# Patient Record
Sex: FEMALE | Race: WHITE | Employment: UNEMPLOYED | URBAN - METROPOLITAN AREA
[De-identification: names, ages, dates, MRNs, and addresses within clinical notes are randomized per-mention and may not be internally consistent; named-entity substitution may affect disease eponyms.]

---

## 2023-03-13 ENCOUNTER — TELEPHONE (OUTPATIENT)
Dept: PRIMARY CARE | Facility: CLINIC | Age: 63
End: 2023-03-13
Payer: COMMERCIAL

## 2023-03-13 NOTE — TELEPHONE ENCOUNTER
Pt. Calling- went [facility]  today and was diagnosed with covid. Should she start any medication. Did not receive any at [place]. Also having depression issues, especially with covid diagnosis. What can she take for that.   [phone]

## 2023-03-17 NOTE — TELEPHONE ENCOUNTER
Spoke with Danisha and she states that she is feeling much better now and she is out of the woods. She states that she is eating now also. Asked her about the depression and that you could see her on Monday and she states that the depression is better now too and does not need to be seen. She thinks it was just all from being sick and having covid. Let her know to call us if any concerns develop.

## 2023-03-17 NOTE — TELEPHONE ENCOUNTER
Please call her and tell her that I am very sorry that I am just getting to her message. She was told she had covid; ask her what symptoms of covid she is having now? Also tell her that I can see her on Monday at 9:30 and we can address her depression as well. She will be good to come to the office on Monday regarding her covid, as it was DX March 13. She just needs to wear a mask. Thank you.

## 2023-05-08 ENCOUNTER — OFFICE VISIT (OUTPATIENT)
Dept: PRIMARY CARE | Facility: CLINIC | Age: 63
End: 2023-05-08
Payer: COMMERCIAL

## 2023-05-08 VITALS
DIASTOLIC BLOOD PRESSURE: 76 MMHG | SYSTOLIC BLOOD PRESSURE: 145 MMHG | WEIGHT: 93 LBS | HEART RATE: 76 BPM | BODY MASS INDEX: 18.75 KG/M2 | HEIGHT: 59 IN

## 2023-05-08 DIAGNOSIS — I10 BENIGN HYPERTENSION: Primary | ICD-10-CM

## 2023-05-08 PROBLEM — M81.0 OSTEOPOROSIS: Status: ACTIVE | Noted: 2023-05-08

## 2023-05-08 PROCEDURE — 99214 OFFICE O/P EST MOD 30 MIN: CPT | Performed by: INTERNAL MEDICINE

## 2023-05-08 PROCEDURE — 3077F SYST BP >= 140 MM HG: CPT | Performed by: INTERNAL MEDICINE

## 2023-05-08 PROCEDURE — 3078F DIAST BP <80 MM HG: CPT | Performed by: INTERNAL MEDICINE

## 2023-05-08 PROCEDURE — 1036F TOBACCO NON-USER: CPT | Performed by: INTERNAL MEDICINE

## 2023-05-08 RX ORDER — CEPHRADINE 500 MG
1 CAPSULE ORAL DAILY
COMMUNITY

## 2023-05-08 RX ORDER — GLUCOSAMINE/MSM/CHONDROIT SULF 500-166.6
TABLET ORAL
COMMUNITY
Start: 2022-04-29

## 2023-05-08 RX ORDER — CALCIUM CARBONATE/VITAMIN D3 500-10/5ML
1 LIQUID (ML) ORAL
COMMUNITY

## 2023-05-08 RX ORDER — AMLODIPINE BESYLATE 5 MG/1
5 TABLET ORAL DAILY
COMMUNITY
Start: 2023-05-07 | End: 2023-09-13 | Stop reason: SDUPTHER

## 2023-05-08 RX ORDER — ACETYLCYSTEINE 600 MG
1 CAPSULE ORAL DAILY
COMMUNITY

## 2023-05-08 RX ORDER — ESOMEPRAZOLE MAGNESIUM 40 MG/1
40 CAPSULE, DELAYED RELEASE ORAL 2 TIMES DAILY
COMMUNITY

## 2023-05-08 ASSESSMENT — PATIENT HEALTH QUESTIONNAIRE - PHQ9
1. LITTLE INTEREST OR PLEASURE IN DOING THINGS: NOT AT ALL
SUM OF ALL RESPONSES TO PHQ9 QUESTIONS 1 AND 2: 0
2. FEELING DOWN, DEPRESSED OR HOPELESS: NOT AT ALL

## 2023-05-08 ASSESSMENT — PAIN SCALES - GENERAL: PAINLEVEL: 0-NO PAIN

## 2023-05-08 NOTE — PROGRESS NOTES
"Subjective   Danisha Dumas is a 63 y.o. female who presents for Hypertension and Follow-up.    Folow up on recently starting amlodipine  She has been noting faster heart rate at home  She is not aware of feeling rapid HR  She had been taking her bp after her shower     No headache  No sob  No chest pain     Seh had an MRI that showed   She is drinking 40 to 60 ounces water a day.  She had blood tests that I ordered  that are not avail in care everywhere   Review of Systems    Objective   /76 (BP Location: Left arm, Patient Position: Sitting, BP Cuff Size: Adult)   Pulse 76   Ht 1.486 m (4' 10.5\")   Wt (!) 42.2 kg (93 lb)   BMI 19.11 kg/m²    Physical Exam  Visit Vitals  /76 (BP Location: Left arm, Patient Position: Sitting, BP Cuff Size: Adult)   Pulse 76   Ht 1.486 m (4' 10.5\")   Wt (!) 42.2 kg (93 lb)   BMI 19.11 kg/m²   Smoking Status Never   BSA 1.32 m²      GEN: NAD  HEENT: normal  NECK: no adenopathy, no thyroid enlargment  LUNGS: CTAB  CV: reg S1/S2 no murmurs  EXT: no leg edema   Assessment/Plan   Problem List Items Addressed This Visit    None  Visit Diagnoses       Benign hypertension    -  Primary remain on amlodipine.  She did not need a refill,I will look for labs .  See me in Nov.                "

## 2023-08-03 ENCOUNTER — HOSPITAL ENCOUNTER (OUTPATIENT)
Dept: DATA CONVERSION | Facility: HOSPITAL | Age: 63
End: 2023-08-03
Attending: INTERNAL MEDICINE
Payer: COMMERCIAL

## 2023-08-03 DIAGNOSIS — K44.9 DIAPHRAGMATIC HERNIA WITHOUT OBSTRUCTION OR GANGRENE: ICD-10-CM

## 2023-08-03 DIAGNOSIS — K83.8 OTHER SPECIFIED DISEASES OF BILIARY TRACT: ICD-10-CM

## 2023-08-03 DIAGNOSIS — K31.7 POLYP OF STOMACH AND DUODENUM: ICD-10-CM

## 2023-08-03 DIAGNOSIS — K31.89 OTHER DISEASES OF STOMACH AND DUODENUM: ICD-10-CM

## 2023-08-03 DIAGNOSIS — R74.8 ABNORMAL LEVELS OF OTHER SERUM ENZYMES: ICD-10-CM

## 2023-09-13 DIAGNOSIS — I10 BENIGN HYPERTENSION: ICD-10-CM

## 2023-09-13 RX ORDER — AMLODIPINE BESYLATE 5 MG/1
5 TABLET ORAL DAILY
Qty: 30 TABLET | Refills: 1 | Status: SHIPPED | OUTPATIENT
Start: 2023-09-13 | End: 2023-10-24

## 2023-09-13 NOTE — TELEPHONE ENCOUNTER
Med refill    amLODIPine (Norvasc) 5 mg tablet    Take 1 tablet (5 mg) by mouth once daily     30 days    [facility] [address]    [place]

## 2023-10-23 DIAGNOSIS — I10 BENIGN HYPERTENSION: ICD-10-CM

## 2023-10-24 RX ORDER — AMLODIPINE BESYLATE 5 MG/1
5 TABLET ORAL DAILY
Qty: 30 TABLET | Refills: 1 | Status: SHIPPED | OUTPATIENT
Start: 2023-10-24 | End: 2023-10-27 | Stop reason: SDUPTHER

## 2023-10-24 NOTE — TELEPHONE ENCOUNTER
Med refill      amLODIPine (Norvasc) 5 mg tablet    Take 1 tablet (5 mg) by mouth once daily.     30 days w/refills    [facility] [address]    [place]

## 2023-10-27 ENCOUNTER — TELEPHONE (OUTPATIENT)
Dept: PRIMARY CARE | Facility: CLINIC | Age: 63
End: 2023-10-27
Payer: COMMERCIAL

## 2023-10-27 DIAGNOSIS — I10 BENIGN HYPERTENSION: ICD-10-CM

## 2023-10-27 RX ORDER — AMLODIPINE BESYLATE 5 MG/1
5 TABLET ORAL DAILY
Qty: 30 TABLET | Refills: 11 | Status: SHIPPED | OUTPATIENT
Start: 2023-10-27

## 2023-10-27 NOTE — TELEPHONE ENCOUNTER
Pt needs a refill on Amlodipine 5 mg-she is completely out. Pt checked with her pharmacy and they do not have anything, requested 10 refills.  [facility] [address] In [place]. [phone]  Pt # [phone]

## 2023-11-02 ENCOUNTER — APPOINTMENT (OUTPATIENT)
Dept: PRIMARY CARE | Facility: CLINIC | Age: 63
End: 2023-11-02
Payer: COMMERCIAL

## 2023-12-12 ENCOUNTER — APPOINTMENT (OUTPATIENT)
Dept: PRIMARY CARE | Facility: CLINIC | Age: 63
End: 2023-12-12
Payer: COMMERCIAL

## 2024-02-14 ENCOUNTER — TELEPHONE (OUTPATIENT)
Dept: PRIMARY CARE | Facility: CLINIC | Age: 64
End: 2024-02-14

## 2024-02-14 NOTE — TELEPHONE ENCOUNTER
Patient called to find out if she has blood work due for next week. I don't see orders in her chart but she last had it done in May.    Danisha [phone]

## 2024-02-15 DIAGNOSIS — E55.9 VITAMIN D DEFICIENCY: ICD-10-CM

## 2024-02-15 DIAGNOSIS — I10 BENIGN HYPERTENSION: Primary | ICD-10-CM

## 2024-02-16 ENCOUNTER — LAB (OUTPATIENT)
Dept: LAB | Facility: LAB | Age: 64
End: 2024-02-16
Payer: COMMERCIAL

## 2024-02-16 DIAGNOSIS — I10 BENIGN HYPERTENSION: ICD-10-CM

## 2024-02-16 DIAGNOSIS — E55.9 VITAMIN D DEFICIENCY: ICD-10-CM

## 2024-02-16 PROCEDURE — 80061 LIPID PANEL: CPT

## 2024-02-16 PROCEDURE — 85025 COMPLETE CBC W/AUTO DIFF WBC: CPT

## 2024-02-16 PROCEDURE — 36415 COLL VENOUS BLD VENIPUNCTURE: CPT

## 2024-02-16 PROCEDURE — 84443 ASSAY THYROID STIM HORMONE: CPT

## 2024-02-16 PROCEDURE — 80053 COMPREHEN METABOLIC PANEL: CPT

## 2024-02-16 PROCEDURE — 82306 VITAMIN D 25 HYDROXY: CPT

## 2024-02-17 LAB
25(OH)D3 SERPL-MCNC: 63 NG/ML (ref 30–100)
ALBUMIN SERPL BCP-MCNC: 3.8 G/DL (ref 3.4–5)
ALP SERPL-CCNC: 92 U/L (ref 33–136)
ALT SERPL W P-5'-P-CCNC: 17 U/L (ref 7–45)
ANION GAP SERPL CALC-SCNC: 14 MMOL/L (ref 10–20)
AST SERPL W P-5'-P-CCNC: 42 U/L (ref 9–39)
BASOPHILS # BLD AUTO: 0.08 X10*3/UL (ref 0–0.1)
BASOPHILS NFR BLD AUTO: 0.9 %
BILIRUB SERPL-MCNC: 0.4 MG/DL (ref 0–1.2)
BUN SERPL-MCNC: 17 MG/DL (ref 6–23)
CALCIUM SERPL-MCNC: 10.1 MG/DL (ref 8.6–10.6)
CHLORIDE SERPL-SCNC: 105 MMOL/L (ref 98–107)
CHOLEST SERPL-MCNC: 216 MG/DL (ref 0–199)
CHOLESTEROL/HDL RATIO: 6
CO2 SERPL-SCNC: 26 MMOL/L (ref 21–32)
CREAT SERPL-MCNC: 0.65 MG/DL (ref 0.5–1.05)
EGFRCR SERPLBLD CKD-EPI 2021: >90 ML/MIN/1.73M*2
EOSINOPHIL # BLD AUTO: 0.39 X10*3/UL (ref 0–0.7)
EOSINOPHIL NFR BLD AUTO: 4.5 %
ERYTHROCYTE [DISTWIDTH] IN BLOOD BY AUTOMATED COUNT: 14.2 % (ref 11.5–14.5)
GLUCOSE SERPL-MCNC: 91 MG/DL (ref 74–99)
HCT VFR BLD AUTO: 39.8 % (ref 36–46)
HDLC SERPL-MCNC: 36 MG/DL
HGB BLD-MCNC: 12.8 G/DL (ref 12–16)
IMM GRANULOCYTES # BLD AUTO: 0.02 X10*3/UL (ref 0–0.7)
IMM GRANULOCYTES NFR BLD AUTO: 0.2 % (ref 0–0.9)
LDLC SERPL CALC-MCNC: 155 MG/DL
LYMPHOCYTES # BLD AUTO: 2.06 X10*3/UL (ref 1.2–4.8)
LYMPHOCYTES NFR BLD AUTO: 24 %
MCH RBC QN AUTO: 31.1 PG (ref 26–34)
MCHC RBC AUTO-ENTMCNC: 32.2 G/DL (ref 32–36)
MCV RBC AUTO: 97 FL (ref 80–100)
MONOCYTES # BLD AUTO: 0.72 X10*3/UL (ref 0.1–1)
MONOCYTES NFR BLD AUTO: 8.4 %
NEUTROPHILS # BLD AUTO: 5.33 X10*3/UL (ref 1.2–7.7)
NEUTROPHILS NFR BLD AUTO: 62 %
NON HDL CHOLESTEROL: 180 MG/DL (ref 0–149)
NRBC BLD-RTO: 0 /100 WBCS (ref 0–0)
PLATELET # BLD AUTO: 267 X10*3/UL (ref 150–450)
POTASSIUM SERPL-SCNC: 4.8 MMOL/L (ref 3.5–5.3)
PROT SERPL-MCNC: 8.2 G/DL (ref 6.4–8.2)
RBC # BLD AUTO: 4.11 X10*6/UL (ref 4–5.2)
SODIUM SERPL-SCNC: 140 MMOL/L (ref 136–145)
TRIGL SERPL-MCNC: 123 MG/DL (ref 0–149)
TSH SERPL-ACNC: 3.86 MIU/L (ref 0.44–3.98)
VLDL: 25 MG/DL (ref 0–40)
WBC # BLD AUTO: 8.6 X10*3/UL (ref 4.4–11.3)

## 2024-02-19 ENCOUNTER — APPOINTMENT (OUTPATIENT)
Dept: PRIMARY CARE | Facility: CLINIC | Age: 64
End: 2024-02-19

## 2024-04-03 ENCOUNTER — APPOINTMENT (OUTPATIENT)
Dept: PRIMARY CARE | Facility: CLINIC | Age: 64
End: 2024-04-03
Payer: COMMERCIAL

## 2024-04-12 ENCOUNTER — OFFICE VISIT (OUTPATIENT)
Dept: PRIMARY CARE | Facility: CLINIC | Age: 64
End: 2024-04-12
Payer: COMMERCIAL

## 2024-04-12 VITALS
SYSTOLIC BLOOD PRESSURE: 140 MMHG | HEIGHT: 59 IN | RESPIRATION RATE: 16 BRPM | DIASTOLIC BLOOD PRESSURE: 72 MMHG | HEART RATE: 75 BPM | WEIGHT: 95.8 LBS | BODY MASS INDEX: 19.31 KG/M2

## 2024-04-12 DIAGNOSIS — Z51.81 MEDICATION MONITORING ENCOUNTER: ICD-10-CM

## 2024-04-12 DIAGNOSIS — I10 BENIGN ESSENTIAL HTN: ICD-10-CM

## 2024-04-12 DIAGNOSIS — Z12.31 VISIT FOR SCREENING MAMMOGRAM: Primary | ICD-10-CM

## 2024-04-12 DIAGNOSIS — E55.9 VITAMIN D DEFICIENCY: ICD-10-CM

## 2024-04-12 DIAGNOSIS — M81.0 AGE-RELATED OSTEOPOROSIS WITHOUT CURRENT PATHOLOGICAL FRACTURE: ICD-10-CM

## 2024-04-12 DIAGNOSIS — Z00.00 ENCOUNTER FOR PREVENTATIVE ADULT HEALTH CARE EXAMINATION: ICD-10-CM

## 2024-04-12 PROCEDURE — 3077F SYST BP >= 140 MM HG: CPT | Performed by: INTERNAL MEDICINE

## 2024-04-12 PROCEDURE — 99396 PREV VISIT EST AGE 40-64: CPT | Performed by: INTERNAL MEDICINE

## 2024-04-12 PROCEDURE — 1036F TOBACCO NON-USER: CPT | Performed by: INTERNAL MEDICINE

## 2024-04-12 PROCEDURE — 3078F DIAST BP <80 MM HG: CPT | Performed by: INTERNAL MEDICINE

## 2024-04-12 ASSESSMENT — ENCOUNTER SYMPTOMS
DIFFICULTY URINATING: 0
DYSURIA: 0
SHORTNESS OF BREATH: 0
DIARRHEA: 0
FREQUENCY: 0

## 2024-04-12 ASSESSMENT — PAIN SCALES - GENERAL: PAINLEVEL: 0-NO PAIN

## 2024-04-12 NOTE — PATIENT INSTRUCTIONS
Schedule mammogram at [place].    See me again in one year for annual physical.    Before that visit get fasting blood test to recheck cholesterol and other labs.

## 2024-04-12 NOTE — PROGRESS NOTES
"Subjective   Danisha Dumas is a 64 y.o. female who presents for Annual Exam (Pt here for annual physical with c/o lower abdominal pain. ).    She had left sided bunion surgery in November  She had a wound  infection after it but she is better now  Dr Kowalski who is at [email or web address] (works with [facility]) : was a 3D bunion surgery  She is pleased with the results     She would like to work on her cholesterol before starting another med    She had a finding of thickening of her colon on CT and  had abd/pelvic MRI and also ERCP and were all ok    She had a colonoscoy in April 2023  She will be havign an egd this year    Social history: She has retired, it was somewhat unexpected for her.  Was related to COVID.    She is trying to just take on esomeprazole daily rather than 2   Review of Systems   Respiratory:  Negative for shortness of breath.    Cardiovascular:  Negative for chest pain.   Gastrointestinal:  Negative for diarrhea.        She eats 6 prunes every day. She is doing 3 tablespoon of olive oil daily as well for her bones.    Genitourinary:  Negative for difficulty urinating, dysuria, frequency and urgency.       Objective   /80   Pulse 75   Resp 16   Ht 1.486 m (4' 10.5\")   Wt (!) 43.5 kg (95 lb 12.8 oz)   BMI 19.68 kg/m²    Physical Exam  Constitutional:       Appearance: Normal appearance.   HENT:      Right Ear: Tympanic membrane, ear canal and external ear normal.      Left Ear: Tympanic membrane, ear canal and external ear normal.      Mouth/Throat:      Mouth: Mucous membranes are moist.      Pharynx: Oropharynx is clear.   Eyes:      Conjunctiva/sclera: Conjunctivae normal.      Pupils: Pupils are equal, round, and reactive to light.   Cardiovascular:      Rate and Rhythm: Normal rate and regular rhythm.      Heart sounds: Normal heart sounds.   Pulmonary:      Effort: Pulmonary effort is normal.      Breath sounds: Normal breath sounds.   Abdominal:      General: Bowel " "sounds are normal. There is no distension.      Palpations: Abdomen is soft. There is no mass.      Tenderness: There is no abdominal tenderness.   Lymphadenopathy:      Cervical: No cervical adenopathy.   Skin:     General: Skin is warm and dry.   Neurological:      General: No focal deficit present.      Mental Status: Mental status is at baseline.       Assessment/Plan   Problem List Items Addressed This Visit    None  Visit Diagnoses       Visit for screening mammogram    -  she can schedule after April 14 . Goes to [facility]. Printed order given.    Relevant Orders    BI mammo bilateral screening tomosynthesis    Encounter for preventative adult health care examination    we discussed her LDL cholesterol had increased, discussed the ideal goal of less than 100.  She had been on statins in the past but remember that they made her \"foggy headed\".  She has family history of heart disease and also stroke so she realizes that she may need to begin a statin.  Continue on amlodipine 5 mg daily for her blood pressure.  She continues on esomeprazole, she is trying to her dose down to once a day on most days.  She takes multiple supplements which will be scanned into the chart.  I will see her again in 1 year for annual physical.               "

## 2024-11-18 DIAGNOSIS — I10 BENIGN HYPERTENSION: ICD-10-CM

## 2024-11-18 RX ORDER — AMLODIPINE BESYLATE 5 MG/1
5 TABLET ORAL DAILY
Qty: 30 TABLET | Refills: 4 | Status: SHIPPED | OUTPATIENT
Start: 2024-11-18

## 2024-11-19 ENCOUNTER — TELEPHONE (OUTPATIENT)
Dept: PRIMARY CARE | Facility: CLINIC | Age: 64
End: 2024-11-19
Payer: COMMERCIAL

## 2024-11-19 NOTE — TELEPHONE ENCOUNTER
Patient calling with depression and anxiety.   I put her on for 3:45 tomorrow as it didn't sound like she should wait.       Danisha  [phone]

## 2024-11-20 ENCOUNTER — APPOINTMENT (OUTPATIENT)
Dept: PRIMARY CARE | Facility: CLINIC | Age: 64
End: 2024-11-20
Payer: COMMERCIAL

## 2025-01-28 ENCOUNTER — TELEPHONE (OUTPATIENT)
Dept: PRIMARY CARE | Facility: CLINIC | Age: 65
End: 2025-01-28
Payer: COMMERCIAL

## 2025-01-28 NOTE — TELEPHONE ENCOUNTER
"Patient asking for appointment for \"feelings of depression\"    I scheduled her for 1/30 Thursday a.m.    Do you want to see her sooner?    Danisha    "

## 2025-01-30 ENCOUNTER — OFFICE VISIT (OUTPATIENT)
Dept: PRIMARY CARE | Facility: CLINIC | Age: 65
End: 2025-01-30
Payer: COMMERCIAL

## 2025-01-30 VITALS
RESPIRATION RATE: 16 BRPM | WEIGHT: 96.3 LBS | SYSTOLIC BLOOD PRESSURE: 133 MMHG | DIASTOLIC BLOOD PRESSURE: 83 MMHG | HEIGHT: 58 IN | BODY MASS INDEX: 20.21 KG/M2 | HEART RATE: 79 BPM

## 2025-01-30 DIAGNOSIS — J06.9 ACUTE URI: ICD-10-CM

## 2025-01-30 DIAGNOSIS — F43.21 BRIEF DEPRESSIVE REACTION: Primary | ICD-10-CM

## 2025-01-30 DIAGNOSIS — B34.9 VIRAL SYNDROME: ICD-10-CM

## 2025-01-30 PROCEDURE — 99214 OFFICE O/P EST MOD 30 MIN: CPT | Performed by: INTERNAL MEDICINE

## 2025-01-30 PROCEDURE — 1036F TOBACCO NON-USER: CPT | Performed by: INTERNAL MEDICINE

## 2025-01-30 PROCEDURE — 3008F BODY MASS INDEX DOCD: CPT | Performed by: INTERNAL MEDICINE

## 2025-01-30 ASSESSMENT — PATIENT HEALTH QUESTIONNAIRE - PHQ9
2. FEELING DOWN, DEPRESSED OR HOPELESS: NOT AT ALL
SUM OF ALL RESPONSES TO PHQ9 QUESTIONS 1 AND 2: 0
1. LITTLE INTEREST OR PLEASURE IN DOING THINGS: NOT AT ALL

## 2025-01-30 ASSESSMENT — PAIN SCALES - GENERAL: PAINLEVEL_OUTOF10: 0-NO PAIN

## 2025-01-30 NOTE — PATIENT INSTRUCTIONS
"Add Vitamin  B 12:  2500 mcg tablets once daily.   Can take up to 5000 mcg of vitamin B12 daily  You can take an additional \"B-Complex\"   Folic acid 1 mg daily   Remain on D3, K12     Look into trying \"hedonia\" at www.tryhedonia.com  This is a supplement for mood disorders that does not need a doctor's order  Can be taken daily, is a capsule.    Deplin: is a \"medical food\" and does need doctor's order, but you can look this up at www.deplin.Kuddle    Keep next rick with me.   "

## 2025-01-30 NOTE — PROGRESS NOTES
"Subjective   Patient ID: Danisha Dumas is a 64 y.o. female who presents for Follow-up.    HPI     Jan 17 : \"ill with flu\" was taking otc meds   She had cough and nasal sniffles and had a peak fever of 102  She never gets the flu vaccine.  She feels generally better, no longer febrile but still has runny nose and some cough.    When this started, she took Zyrtec for 5 days until she began with  stomach upset and burning  The Saturday after that she said she started \"feeling depression coming on me\" and it got progressively worse   She was feeling very fatigued, feeling like she did not want to do anything.  When she noticed that she stopped taking the Serotec, and on Tuesday and Wednesday of this day she started feeling better, like my life is not falling apart\".    That she realizes this has happened in the past with Zyrtec and also one-time with Tamiflu.  She says she does not want to ever take Tamiflu again..    Her depression symptoms are fatigue and poor appetitie   She started eating more   She says \"usually when I have depression it is because of something that I tool  She says that \"the medications I could take when I was younger now bother me\"    Also mentioned that she does not like the winter as it is with the shorter days and much less sun.  She does use a light box.    Discussed that she called last year November complaining of depression; she said she had been taking a collagen peptide supplement for about a month and eventually found out that this could be associated with depression.  When she stopped it her depression lifted.  Review of Systems      Current Outpatient Medications:     amLODIPine (Norvasc) 5 mg tablet, Take 1 tablet by mouth once daily, Disp: 30 tablet, Rfl: 4    B5-B6-PABA-cord-rhod-P.ginseng (Adrenal Essence) 46-23- mg capsule, Take 1 tablet by mouth once daily., Disp: , Rfl:     cholecalciferol, vitamin D3, 250 mcg (10,000 unit) capsule, Take 1 capsule (250 mcg) by " "mouth once daily., Disp: , Rfl:     esomeprazole (NexIUM) 40 mg DR capsule, Take 1 capsule (40 mg) by mouth 2 times a day., Disp: , Rfl:     magnesium oxide 400 mg magnesium capsule, Take 1 capsule (400 mg) by mouth once daily., Disp: , Rfl:     omega 3-dha-epa-fish oil 360 mg-108 mg- 180 mg-1,200 mg capsule, Take by mouth., Disp: , Rfl:     UNABLE TO FIND, Take by mouth., Disp: , Rfl:     Objective   /83   Pulse 79   Resp 16   Ht 1.473 m (4' 10\")   Wt (!) 43.7 kg (96 lb 4.8 oz)   BMI 20.13 kg/m²     Physical Exam  HENT:      Mouth/Throat:      Mouth: Mucous membranes are moist.      Pharynx: Oropharynx is clear.   Eyes:      Extraocular Movements: Extraocular movements intact.      Conjunctiva/sclera: Conjunctivae normal.      Pupils: Pupils are equal, round, and reactive to light.   Cardiovascular:      Rate and Rhythm: Normal rate and regular rhythm.      Heart sounds: Normal heart sounds.   Pulmonary:      Effort: Pulmonary effort is normal.      Breath sounds: Normal breath sounds.         Assessment/Plan   Problem List Items Addressed This Visit    None  Visit Diagnoses       Brief depressive reaction    -she said she takes a supplement with vitamin D3 and vitamin K 12.  Also takes a \"B complex\".  Discussed taking vitamin B12 at up to 5000 mcg daily total dose.  She should do this year-round.  If she starts at around August it will help lessen her seasonal depression symptoms.  I also recommended the supplement Hedonia which does not need a doctor's order.  Mention that there is also Deplin which is a methyl folate medical food that would need a doctor's order.  She feels like she is back to normal except for the winter doldrums.    Acute URI    she is getting over this on her own.  She does not want anything for it; she says that Tessalon Perles do not help her.    Viral syndrome         She should keep her next appointment with me in April for her Medicare wellness visit.          "

## 2025-04-10 PROBLEM — I10 BENIGN HYPERTENSION: Status: ACTIVE | Noted: 2025-04-10

## 2025-04-10 PROBLEM — F43.21 BRIEF DEPRESSIVE REACTION: Status: ACTIVE | Noted: 2025-04-10

## 2025-04-10 NOTE — PROGRESS NOTES
Subjective   Patient ID: Danisha Dumas is a 65 y.o. female who presents for annual physical.    HPI   She states that since Saturday 04/12 she has been feeling very well. Prior to, she had 4-5 days of feeling depressed, poor motivation. Feels the winter time is worse for her, but this has been all of her life. Tried Zoloft for about 3 months ~17 years ago , but made her drowsy and discontinued. Seeing a homeopathic doctor -Dr. Blair- soon and would like to discuss with him alternative therapy options.    She is sleeping well.     She cannot tolerate the supplement  Hedonia, it causes heartburn.    Considering volunteering somewhere to keep herself busy. She feels that if she is occupied she does better.     No new GI issues. Trying to take Nexium once a day instead of twice daily. Upper endo and colonoscopy sometime next year. Bowels are improving with dietary changes.     Review of Systems   Respiratory:  Negative for cough and shortness of breath.    Cardiovascular:  Negative for chest pain and palpitations.   Gastrointestinal:  Negative for constipation and diarrhea.   Neurological:  Negative for dizziness and headaches.     No change in history otherwise.    Current Outpatient Medications:     amLODIPine (Norvasc) 5 mg tablet, Take 1 tablet by mouth once daily, Disp: 30 tablet, Rfl: 4    B5-B6-PABA-cord-rhod-P.ginseng (Adrenal Essence) 51-35- mg capsule, Take 1 tablet by mouth once daily., Disp: , Rfl:     cholecalciferol, vitamin D3, 250 mcg (10,000 unit) capsule, Take 1 capsule (250 mcg) by mouth once daily., Disp: , Rfl:     esomeprazole (NexIUM) 40 mg DR capsule, Take 1 capsule (40 mg) by mouth 2 times a day., Disp: , Rfl:     magnesium oxide 400 mg magnesium capsule, Take 1 capsule (400 mg) by mouth once daily., Disp: , Rfl:     omega 3-dha-epa-fish oil 360 mg-108 mg- 180 mg-1,200 mg capsule, Take by mouth., Disp: , Rfl:     UNABLE TO FIND, Take by mouth., Disp: , Rfl:     Objective   BP  "126/83   Pulse 73   Resp 16   Ht 1.473 m (4' 10\")   Wt (!) 41.9 kg (92 lb 6.4 oz)   BMI 19.31 kg/m²     Physical Exam  Constitutional:       Appearance: Normal appearance.   Eyes:      Conjunctiva/sclera: Conjunctivae normal.      Pupils: Pupils are equal, round, and reactive to light.   Cardiovascular:      Rate and Rhythm: Normal rate and regular rhythm.      Heart sounds: Normal heart sounds.   Pulmonary:      Effort: Pulmonary effort is normal.      Breath sounds: Normal breath sounds.   Lymphadenopathy:      Cervical: No cervical adenopathy.   Skin:     General: Skin is warm and dry.   Neurological:      General: No focal deficit present.         Assessment/Plan   Problem List Items Addressed This Visit          Cardiac and Vasculature    Benign hypertension     Remain on Amlodipine 5 mg daily             Gastrointestinal and Abdominal    Gastroesophageal reflux disease     Continue taking esomeprazole 40mg 1-2 times daily            Health Encounters    Routine adult health maintenance - Primary     Annual physical completed today.  Labs ordered.   Mammogram due in June 2025 and ordered.             Mental Health    Brief depressive reaction     Had 4-5 days of depression that has improved since Saturday. Remains open to starting something if an episode occurs again.          Other Visit Diagnoses       Visit for screening mammogram        Vitamin D deficiency                Please return to see me in 6 months for follow-up for depression.    Scribe Attestation  By signing my name below, ILyla Scribe   attest that this documentation has been prepared under the direction and in the presence of Mdaisyn Chapman DO.    "

## 2025-04-10 NOTE — PATIENT INSTRUCTIONS
Work on Volunteering , I think that will help you with your mood.    See me again in October or November for 30 min follow up on mood .    I ordered labs for you today. Get these done sometime this month.   Get them done after fasting overnight 8-10 hours.  Lab downstairs is open Monday-Friday 7am-4pm.

## 2025-04-14 ENCOUNTER — APPOINTMENT (OUTPATIENT)
Dept: PRIMARY CARE | Facility: CLINIC | Age: 65
End: 2025-04-14
Payer: COMMERCIAL

## 2025-04-14 VITALS
BODY MASS INDEX: 19.39 KG/M2 | DIASTOLIC BLOOD PRESSURE: 83 MMHG | WEIGHT: 92.4 LBS | SYSTOLIC BLOOD PRESSURE: 126 MMHG | HEIGHT: 58 IN | HEART RATE: 73 BPM | RESPIRATION RATE: 16 BRPM

## 2025-04-14 DIAGNOSIS — Z00.00 ENCOUNTER FOR PREVENTATIVE ADULT HEALTH CARE EXAMINATION: ICD-10-CM

## 2025-04-14 DIAGNOSIS — K21.9 GASTROESOPHAGEAL REFLUX DISEASE, UNSPECIFIED WHETHER ESOPHAGITIS PRESENT: ICD-10-CM

## 2025-04-14 DIAGNOSIS — F43.21 BRIEF DEPRESSIVE REACTION: ICD-10-CM

## 2025-04-14 DIAGNOSIS — Z12.31 VISIT FOR SCREENING MAMMOGRAM: ICD-10-CM

## 2025-04-14 DIAGNOSIS — Z00.00 ROUTINE ADULT HEALTH MAINTENANCE: Primary | ICD-10-CM

## 2025-04-14 DIAGNOSIS — I10 BENIGN HYPERTENSION: ICD-10-CM

## 2025-04-14 DIAGNOSIS — E55.9 VITAMIN D DEFICIENCY: ICD-10-CM

## 2025-04-14 PROCEDURE — 1036F TOBACCO NON-USER: CPT | Performed by: INTERNAL MEDICINE

## 2025-04-14 PROCEDURE — 99396 PREV VISIT EST AGE 40-64: CPT | Performed by: INTERNAL MEDICINE

## 2025-04-14 PROCEDURE — 3074F SYST BP LT 130 MM HG: CPT | Performed by: INTERNAL MEDICINE

## 2025-04-14 PROCEDURE — 3008F BODY MASS INDEX DOCD: CPT | Performed by: INTERNAL MEDICINE

## 2025-04-14 PROCEDURE — 3079F DIAST BP 80-89 MM HG: CPT | Performed by: INTERNAL MEDICINE

## 2025-04-14 ASSESSMENT — ENCOUNTER SYMPTOMS
DIARRHEA: 0
DIZZINESS: 0
HEADACHES: 0
PALPITATIONS: 0
COUGH: 0
SHORTNESS OF BREATH: 0
CONSTIPATION: 0

## 2025-04-14 ASSESSMENT — PATIENT HEALTH QUESTIONNAIRE - PHQ9
SUM OF ALL RESPONSES TO PHQ9 QUESTIONS 1 AND 2: 2
2. FEELING DOWN, DEPRESSED OR HOPELESS: SEVERAL DAYS
1. LITTLE INTEREST OR PLEASURE IN DOING THINGS: SEVERAL DAYS

## 2025-04-14 ASSESSMENT — PAIN SCALES - GENERAL: PAINLEVEL_OUTOF10: 0-NO PAIN

## 2025-04-14 NOTE — ASSESSMENT & PLAN NOTE
Had 4-5 days of depression that has improved since Saturday. Remains open to starting something if an episode occurs again. In the interim, she will continue to look for activities to fill her time.

## 2025-04-21 DIAGNOSIS — I10 BENIGN HYPERTENSION: ICD-10-CM

## 2025-04-21 RX ORDER — AMLODIPINE BESYLATE 5 MG/1
5 TABLET ORAL DAILY
Qty: 30 TABLET | Refills: 6 | Status: SHIPPED | OUTPATIENT
Start: 2025-04-21

## 2025-05-27 ENCOUNTER — TELEPHONE (OUTPATIENT)
Dept: PRIMARY CARE | Facility: CLINIC | Age: 65
End: 2025-05-27
Payer: COMMERCIAL

## 2025-05-27 NOTE — TELEPHONE ENCOUNTER
Patient LMOM asking to be seen for depression.   I scheduled her for Thursday @ 9    [facility]

## 2025-05-28 ENCOUNTER — APPOINTMENT (OUTPATIENT)
Dept: PRIMARY CARE | Facility: CLINIC | Age: 65
End: 2025-05-28
Payer: COMMERCIAL

## 2025-05-28 LAB
25(OH)D3+25(OH)D2 SERPL-MCNC: 75 NG/ML (ref 30–100)
ALBUMIN SERPL-MCNC: 4 G/DL (ref 3.6–5.1)
ALP SERPL-CCNC: 80 U/L (ref 37–153)
ALT SERPL-CCNC: 16 U/L (ref 6–29)
ANION GAP SERPL CALCULATED.4IONS-SCNC: 8 MMOL/L (CALC) (ref 7–17)
AST SERPL-CCNC: 21 U/L (ref 10–35)
BASOPHILS # BLD AUTO: 32 CELLS/UL (ref 0–200)
BASOPHILS NFR BLD AUTO: 0.5 %
BILIRUB SERPL-MCNC: 0.5 MG/DL (ref 0.2–1.2)
BUN SERPL-MCNC: 16 MG/DL (ref 7–25)
CALCIUM SERPL-MCNC: 10.3 MG/DL (ref 8.6–10.4)
CHLORIDE SERPL-SCNC: 105 MMOL/L (ref 98–110)
CHOLEST SERPL-MCNC: 221 MG/DL
CHOLEST/HDLC SERPL: 5 (CALC)
CO2 SERPL-SCNC: 27 MMOL/L (ref 20–32)
CREAT SERPL-MCNC: 0.75 MG/DL (ref 0.5–1.05)
EGFRCR SERPLBLD CKD-EPI 2021: 89 ML/MIN/1.73M2
EOSINOPHIL # BLD AUTO: 224 CELLS/UL (ref 15–500)
EOSINOPHIL NFR BLD AUTO: 3.5 %
ERYTHROCYTE [DISTWIDTH] IN BLOOD BY AUTOMATED COUNT: 13.1 % (ref 11–15)
GLUCOSE SERPL-MCNC: 104 MG/DL (ref 65–99)
HCT VFR BLD AUTO: 41.8 % (ref 35–45)
HDLC SERPL-MCNC: 44 MG/DL
HGB BLD-MCNC: 13.4 G/DL (ref 11.7–15.5)
LDLC SERPL CALC-MCNC: 157 MG/DL (CALC)
LYMPHOCYTES # BLD AUTO: 1658 CELLS/UL (ref 850–3900)
LYMPHOCYTES NFR BLD AUTO: 25.9 %
MCH RBC QN AUTO: 31.2 PG (ref 27–33)
MCHC RBC AUTO-ENTMCNC: 32.1 G/DL (ref 32–36)
MCV RBC AUTO: 97.2 FL (ref 80–100)
MONOCYTES # BLD AUTO: 454 CELLS/UL (ref 200–950)
MONOCYTES NFR BLD AUTO: 7.1 %
NEUTROPHILS # BLD AUTO: 4032 CELLS/UL (ref 1500–7800)
NEUTROPHILS NFR BLD AUTO: 63 %
NONHDLC SERPL-MCNC: 177 MG/DL (CALC)
PLATELET # BLD AUTO: 294 THOUSAND/UL (ref 140–400)
PMV BLD REES-ECKER: 11.1 FL (ref 7.5–12.5)
POTASSIUM SERPL-SCNC: 4.4 MMOL/L (ref 3.5–5.3)
PROT SERPL-MCNC: 7.9 G/DL (ref 6.1–8.1)
RBC # BLD AUTO: 4.3 MILLION/UL (ref 3.8–5.1)
SODIUM SERPL-SCNC: 140 MMOL/L (ref 135–146)
TRIGL SERPL-MCNC: 95 MG/DL
WBC # BLD AUTO: 6.4 THOUSAND/UL (ref 3.8–10.8)

## 2025-05-29 ENCOUNTER — APPOINTMENT (OUTPATIENT)
Dept: PRIMARY CARE | Facility: CLINIC | Age: 65
End: 2025-05-29
Payer: COMMERCIAL

## 2025-05-29 ENCOUNTER — OFFICE VISIT (OUTPATIENT)
Dept: PRIMARY CARE | Facility: CLINIC | Age: 65
End: 2025-05-29
Payer: COMMERCIAL

## 2025-05-29 VITALS
HEIGHT: 58 IN | HEART RATE: 61 BPM | SYSTOLIC BLOOD PRESSURE: 152 MMHG | WEIGHT: 89 LBS | BODY MASS INDEX: 18.68 KG/M2 | RESPIRATION RATE: 16 BRPM | DIASTOLIC BLOOD PRESSURE: 80 MMHG

## 2025-05-29 DIAGNOSIS — F33.1 MODERATE EPISODE OF RECURRENT MAJOR DEPRESSIVE DISORDER: Primary | ICD-10-CM

## 2025-05-29 PROCEDURE — 99214 OFFICE O/P EST MOD 30 MIN: CPT | Performed by: INTERNAL MEDICINE

## 2025-05-29 PROCEDURE — 3008F BODY MASS INDEX DOCD: CPT | Performed by: INTERNAL MEDICINE

## 2025-05-29 PROCEDURE — 1036F TOBACCO NON-USER: CPT | Performed by: INTERNAL MEDICINE

## 2025-05-29 PROCEDURE — 3077F SYST BP >= 140 MM HG: CPT | Performed by: INTERNAL MEDICINE

## 2025-05-29 PROCEDURE — 3079F DIAST BP 80-89 MM HG: CPT | Performed by: INTERNAL MEDICINE

## 2025-05-29 RX ORDER — DULOXETIN HYDROCHLORIDE 20 MG/1
CAPSULE, DELAYED RELEASE ORAL
Qty: 60 CAPSULE | Refills: 1 | Status: SHIPPED | OUTPATIENT
Start: 2025-05-29

## 2025-05-29 RX ORDER — THYROID 60 MG/1
60 TABLET ORAL DAILY
COMMUNITY

## 2025-05-29 ASSESSMENT — PATIENT HEALTH QUESTIONNAIRE - PHQ9
2. FEELING DOWN, DEPRESSED OR HOPELESS: MORE THAN HALF THE DAYS
3. TROUBLE FALLING OR STAYING ASLEEP OR SLEEPING TOO MUCH: SEVERAL DAYS
1. LITTLE INTEREST OR PLEASURE IN DOING THINGS: MORE THAN HALF THE DAYS
SUM OF ALL RESPONSES TO PHQ9 QUESTIONS 1 AND 2: 4
9. THOUGHTS THAT YOU WOULD BE BETTER OFF DEAD, OR OF HURTING YOURSELF: NOT AT ALL
8. MOVING OR SPEAKING SO SLOWLY THAT OTHER PEOPLE COULD HAVE NOTICED. OR THE OPPOSITE, BEING SO FIGETY OR RESTLESS THAT YOU HAVE BEEN MOVING AROUND A LOT MORE THAN USUAL: SEVERAL DAYS
SUM OF ALL RESPONSES TO PHQ QUESTIONS 1-9: 10
6. FEELING BAD ABOUT YOURSELF - OR THAT YOU ARE A FAILURE OR HAVE LET YOURSELF OR YOUR FAMILY DOWN: SEVERAL DAYS
7. TROUBLE CONCENTRATING ON THINGS, SUCH AS READING THE NEWSPAPER OR WATCHING TELEVISION: SEVERAL DAYS
5. POOR APPETITE OR OVEREATING: SEVERAL DAYS
4. FEELING TIRED OR HAVING LITTLE ENERGY: SEVERAL DAYS

## 2025-05-29 ASSESSMENT — ENCOUNTER SYMPTOMS
HEADACHES: 0
SLEEP DISTURBANCE: 0
DYSPHORIC MOOD: 1

## 2025-05-29 ASSESSMENT — PAIN SCALES - GENERAL: PAINLEVEL_OUTOF10: 0-NO PAIN

## 2025-05-29 NOTE — PROGRESS NOTES
"Subjective   Patient ID: Danisha Dumas is a 65 y.o. female who presents for acute concern.    HPI       Patient presents today with concerns of depression. She is tearful in office today.    She states that it lasts for days, she will have a day or two where she feels normal then goes back into feeling low.    She has no motivation. Notes anxiety with awakineing, feels overwhelmed with the prospect of a new day. Has to force herself to do almost anything ; forces herself to stay up during the day so she can sleep at night. She feels a \"heaviness\" over her. Feels like her head is \"numb\".States it is taking away from the person she used to be.    No longer looking forward to the future.     Denies suicidal ideations.     Has had episodes of this in the past ~18 years ago that she contributes to taking Tamiflu.    She states symptoms started in November and contributes this to collagen use and occasional anti-histamine use.     She doesn't like/want to be alone at home.     Denies headaches, difficulty sleeping.     She is now taking a compounded thyroid med at 60 mg.  Follows with Dr Blair.   She is taking her adrenal supplement, nothing else new.      Latest Reference Range & Units 02/16/24 10:30 05/28/25 10:04   GLUCOSE 74 - 99 mg/dL 91    GLUCOSE 65 - 99 mg/dL  104 (H)   SODIUM 136 - 145 mmol/L 140    SODIUM 135 - 146 mmol/L  140   POTASSIUM 3.5 - 5.3 mmol/L 4.8    POTASSIUM 3.5 - 5.3 mmol/L  4.4   CHLORIDE 98 - 107 mmol/L 105    CHLORIDE 98 - 110 mmol/L  105   Bicarbonate 21 - 32 mmol/L 26    CARBON DIOXIDE 20 - 32 mmol/L  27   Anion Gap 10 - 20 mmol/L 14    ELECTROLYTE BALANCE 7 - 17 mmol/L (calc)  8   Blood Urea Nitrogen 6 - 23 mg/dL 17    UREA NITROGEN (BUN) 7 - 25 mg/dL  16   Creatinine 0.50 - 1.05 mg/dL 0.65    CREATININE 0.50 - 1.05 mg/dL  0.75   EGFR >60 mL/min/1.73m*2 >90    EGFR > OR = 60 mL/min/1.73m2  89   Calcium 8.6 - 10.6 mg/dL 10.1    CALCIUM 8.6 - 10.4 mg/dL  10.3   Albumin 3.4 - 5.0 g/dL " 3.8    ALBUMIN 3.6 - 5.1 g/dL  4.0   PROTEIN, TOTAL 6.1 - 8.1 g/dL  7.9   Alkaline Phosphatase 33 - 136 U/L 92    ALKALINE PHOSPHATASE 37 - 153 U/L  80   ALT 7 - 45 U/L 17    ALT 6 - 29 U/L  16   AST 9 - 39 U/L 42 (H)    AST 10 - 35 U/L  21   Bilirubin Total 0.0 - 1.2 mg/dL 0.4    BILIRUBIN, TOTAL 0.2 - 1.2 mg/dL  0.5   CHOLESTEROL, TOTAL <200 mg/dL  221 (H)   HDL CHOLESTEROL mg/dL 36.0    HDL CHOLESTEROL > OR = 50 mg/dL  44 (L)   Cholesterol/HDL Ratio  6.0    CHOL/HDLC RATIO <5.0 (calc)  5.0 (H)   LDL Calculated <=99 mg/dL 155 (H)    LDL-CHOLESTEROL mg/dL (calc)  157 (H)   VLDL 0 - 40 mg/dL 25    TRIGLYCERIDES 0 - 149 mg/dL 123    TRIGLYCERIDES <150 mg/dL  95   Non HDL Cholesterol 0 - 149 mg/dL 180 (H)    NON HDL CHOLESTEROL <130 mg/dL (calc)  177 (H)   Total Protein 6.4 - 8.2 g/dL 8.2    CHOLESTEROL 0 - 199 mg/dL 216 (H)    Thyroid Stimulating Hormone 0.44 - 3.98 mIU/L 3.86    Vitamin D, 25-Hydroxy, Total 30 - 100 ng/mL 63    VITAMIN D,25-OH,TOTAL,IA 30 - 100 ng/mL  75   WBC 4.4 - 11.3 x10*3/uL 8.6    WHITE BLOOD CELL COUNT 3.8 - 10.8 Thousand/uL  6.4   nRBC 0.0 - 0.0 /100 WBCs 0.0    RBC 4.00 - 5.20 x10*6/uL 4.11    RED BLOOD CELL COUNT 3.80 - 5.10 Million/uL  4.30   HEMOGLOBIN 12.0 - 16.0 g/dL 12.8    HEMOGLOBIN 11.7 - 15.5 g/dL  13.4   HEMATOCRIT 36.0 - 46.0 % 39.8    HEMATOCRIT 35.0 - 45.0 %  41.8   MCV 80 - 100 fL 97    MCV 80.0 - 100.0 fL  97.2   MCH 26.0 - 34.0 pg 31.1    MCH 27.0 - 33.0 pg  31.2   MCHC 32.0 - 36.0 g/dL 32.2    MCHC 32.0 - 36.0 g/dL  32.1   RED CELL DISTRIBUTION WIDTH 11.5 - 14.5 % 14.2    RDW 11.0 - 15.0 %  13.1   Platelets 150 - 450 x10*3/uL 267    PLATELET COUNT 140 - 400 Thousand/uL  294   MPV 7.5 - 12.5 fL  11.1   Neutrophils % 40.0 - 80.0 % 62.0    Immature Granulocytes %, Automated 0.0 - 0.9 % 0.2    Lymphocytes % 13.0 - 44.0 % 24.0    Monocytes % 2.0 - 10.0 % 8.4    Eosinophils % 0.0 - 6.0 % 4.5    Basophils % 0.0 - 2.0 % 0.9    Neutrophils Absolute 1.20 - 7.70 x10*3/uL 5.33   "  Immature Granulocytes Absolute, Automated 0.00 - 0.70 x10*3/uL 0.02    Lymphocytes Absolute 1.20 - 4.80 x10*3/uL 2.06    Monocytes Absolute 0.10 - 1.00 x10*3/uL 0.72    Eosinophils Absolute 0.00 - 0.70 x10*3/uL 0.39    Basophils Absolute 0.00 - 0.10 x10*3/uL 0.08    ABSOLUTE NEUTROPHILS 1,500 - 7,800 cells/uL  4,032   ABSOLUTE LYMPHOCYTES 850 - 3,900 cells/uL  1,658   ABSOLUTE MONOCYTES 200 - 950 cells/uL  454   ABSOLUTE EOSINOPHILS 15 - 500 cells/uL  224   ABSOLUTE BASOPHILS 0 - 200 cells/uL  32   NEUTROPHILS %  63   LYMPHOCYTES %  25.9   MONOCYTES %  7.1   EOSINOPHILS %  3.5   BASOPHILS %  0.5   (H): Data is abnormally high  (L): Data is abnormally low    Review of Systems   Neurological:  Negative for headaches.   Psychiatric/Behavioral:  Positive for dysphoric mood. Negative for sleep disturbance and suicidal ideas.        Current Medications[1]    Objective   /80   Pulse 61   Resp 16   Ht 1.473 m (4' 10\")   Wt (!) 40.4 kg (89 lb)   BMI 18.60 kg/m²     Physical Exam  Constitutional:       Appearance: Normal appearance.   HENT:      Mouth/Throat:      Mouth: Mucous membranes are moist.      Pharynx: Oropharynx is clear.   Eyes:      Conjunctiva/sclera: Conjunctivae normal.      Pupils: Pupils are equal, round, and reactive to light.   Cardiovascular:      Rate and Rhythm: Normal rate and regular rhythm.      Heart sounds: Normal heart sounds.   Pulmonary:      Effort: Pulmonary effort is normal.      Breath sounds: Normal breath sounds.   Abdominal:      Palpations: Abdomen is soft.   Lymphadenopathy:      Cervical: No cervical adenopathy.   Skin:     General: Skin is warm and dry.   Neurological:      General: No focal deficit present.   Psychiatric:         Mood and Affect: Mood is depressed. Affect is blunt and tearful.         Speech: Speech normal.         Behavior: Behavior normal.         Thought Content: Thought content normal.         Assessment/Plan   Problem List Items Addressed This Visit " "         Mental Health    Moderate episode of recurrent major depressive disorder - Primary    Her mood continues to decline. She is lacking motivation and forcing herself to do anything. Not looking forward to the future. Feels a \"heaviness\" over her. Tearful in office today. Denies suicidal ideations at this time.   Start taking duloxetine 20 mg once daily for 2 weeks, then increase to BID.   Referral placed to [facility] for talk therapy.         Relevant Medications    DULoxetine (Cymbalta) 20 mg DR capsule    Other Relevant Orders    Referral to [facility]    Follow Up In [facility] - PCP - Established         Please return to see me in 1 month for a 30 minute follow up.     Scribe Attestation  By signing my name below, I, Toby Sousa   attest that this documentation has been prepared under the direction and in the presence of Madisyn Chapman DO.         [1]   Current Outpatient Medications:     thyroid, pork, (Wink) 60 mg tablet, Take 1 tablet (60 mg) by mouth once daily., Disp: , Rfl:     amLODIPine (Norvasc) 5 mg tablet, Take 1 tablet by mouth once daily, Disp: 30 tablet, Rfl: 6    B5-B6-PABA-cord-rhod-P.ginseng (Adrenal Essence) 56-71- mg capsule, Take 1 tablet by mouth once daily., Disp: , Rfl:     cholecalciferol, vitamin D3, 250 mcg (10,000 unit) capsule, Take 1 capsule (250 mcg) by mouth once daily., Disp: , Rfl:     DULoxetine (Cymbalta) 20 mg DR capsule, Take one capsule every day for 2 weeks, and after that take one capsule twice daily ., Disp: 60 capsule, Rfl: 1    esomeprazole (NexIUM) 40 mg DR capsule, Take 1 capsule (40 mg) by mouth 2 times a day., Disp: , Rfl:     magnesium oxide 400 mg magnesium capsule, Take 1 capsule (400 mg) by mouth once daily., Disp: , Rfl:     UNABLE TO FIND, Take by mouth., Disp: , Rfl:     "

## 2025-05-29 NOTE — ASSESSMENT & PLAN NOTE
"Her mood continues to decline. She is lacking motivation and forcing herself to do anything. Not looking forward to the future. Feels a \"heaviness\" over her. Tearful in office today. Denies suicidal ideations at this time.   Start taking duloxetine 20 mg once daily for 2 weeks, then increase to BID.   Referral placed to [facility] for talk therapy.  "

## 2025-05-29 NOTE — PATIENT INSTRUCTIONS
Begin duloxeitne ( cymbalta) 20 mg, ONE DAILY for Two Weeks.  After that take one capsule TWICE DAILY.     I also referred you [facility].  Contact [facility] to schedule a virtual visit.   For talk therapy and also can help with meds.     See me again in one month for 30 min visit.

## 2025-06-27 ENCOUNTER — APPOINTMENT (OUTPATIENT)
Dept: PRIMARY CARE | Facility: CLINIC | Age: 65
End: 2025-06-27
Payer: COMMERCIAL

## 2025-06-27 VITALS
DIASTOLIC BLOOD PRESSURE: 76 MMHG | HEIGHT: 58 IN | RESPIRATION RATE: 16 BRPM | HEART RATE: 82 BPM | SYSTOLIC BLOOD PRESSURE: 136 MMHG | WEIGHT: 91 LBS | BODY MASS INDEX: 19.1 KG/M2

## 2025-06-27 DIAGNOSIS — F33.1 MODERATE EPISODE OF RECURRENT MAJOR DEPRESSIVE DISORDER: Primary | ICD-10-CM

## 2025-06-27 PROCEDURE — 3078F DIAST BP <80 MM HG: CPT | Performed by: INTERNAL MEDICINE

## 2025-06-27 PROCEDURE — 99213 OFFICE O/P EST LOW 20 MIN: CPT | Performed by: INTERNAL MEDICINE

## 2025-06-27 PROCEDURE — 3075F SYST BP GE 130 - 139MM HG: CPT | Performed by: INTERNAL MEDICINE

## 2025-06-27 PROCEDURE — 1036F TOBACCO NON-USER: CPT | Performed by: INTERNAL MEDICINE

## 2025-06-27 PROCEDURE — 3008F BODY MASS INDEX DOCD: CPT | Performed by: INTERNAL MEDICINE

## 2025-06-27 RX ORDER — DULOXETIN HYDROCHLORIDE 20 MG/1
CAPSULE, DELAYED RELEASE ORAL
Qty: 180 CAPSULE | Refills: 1 | Status: SHIPPED | OUTPATIENT
Start: 2025-06-27

## 2025-06-27 ASSESSMENT — PATIENT HEALTH QUESTIONNAIRE - PHQ9
1. LITTLE INTEREST OR PLEASURE IN DOING THINGS: NOT AT ALL
2. FEELING DOWN, DEPRESSED OR HOPELESS: NOT AT ALL
SUM OF ALL RESPONSES TO PHQ9 QUESTIONS 1 AND 2: 0

## 2025-06-27 ASSESSMENT — PAIN SCALES - GENERAL: PAINLEVEL_OUTOF10: 0-NO PAIN

## 2025-06-27 ASSESSMENT — ENCOUNTER SYMPTOMS: SLEEP DISTURBANCE: 1

## 2025-06-27 NOTE — ASSESSMENT & PLAN NOTE
She feels significant improvement since starting on the duloxetine and increasing to the 40 mg dose.   She is also seeing a counselor at [facility].   Continue taking duloxetine 40 mg daily.   Refill sent today.    [As Noted in HPI] : as noted in HPI [Snoring] : snoring [Nonrestorative Sleep] : nonrestorative sleep [Awakes With Dry Mouth] : awakes with dry mouth [Negative] : Pulmonary Hypertension [Difficulty Initiating Sleep] : no difficulty falling asleep [Difficulty Maintaining Sleep] : no difficulty maintaining sleep [Dysesthesias] : no dysesthesias [Paresthesias] : no paresthesias [Unusual Sleep Behavior] : no unusual sleep behavior [Unusual Movements] : no involuntary movements during sleep [Witnessed Apneas] : demonstrated no ~M apnea [Awakes With Headache] : awakes without a headache [Hypersomnolence] : not sleeping much more than usual [Cataplexy] :  no cataplexy [Sleep Paralysis] : no sleep paralysis [Hypnogogic Hallucinations] : no hypnogogic hallucinations [Hypnopompic Hallucinations] : no hypnopompic hallucinations

## 2025-06-27 NOTE — PROGRESS NOTES
"Subjective   Patient ID: Danisha Dumas is a 65 y.o. female who presents for follow up.    HPI     Patient presents today for a follow-up.     She was started on duloxetine at her last appointment and feels like it has helped now that she is on the 40 mg dose.   She says she is more her \"normal self\"  On the 20 mg she still had down days.   She says her sleep is normal for her.    She is not tearful today, and says that she is much  more hopeful and future focused.  She is also seeing a counselor at [facility].         No labs obtained prior to visit today.    Review of Systems   Psychiatric/Behavioral:  Positive for sleep disturbance.        Current Medications[1]    Objective   /76   Pulse 82   Resp 16   Ht (!) 1.473 m (4' 10\")   Wt (!) 41.3 kg (91 lb)   BMI 19.02 kg/m²     Physical Exam  Constitutional:       Appearance: Normal appearance.   Eyes:      Conjunctiva/sclera: Conjunctivae normal.      Pupils: Pupils are equal, round, and reactive to light.   Cardiovascular:      Rate and Rhythm: Normal rate and regular rhythm.      Heart sounds: Normal heart sounds.   Pulmonary:      Effort: Pulmonary effort is normal.      Breath sounds: Normal breath sounds.   Skin:     General: Skin is warm and dry.   Neurological:      General: No focal deficit present.   Psychiatric:         Mood and Affect: Mood normal.         Assessment/Plan   Problem List Items Addressed This Visit       Moderate episode of recurrent major depressive disorder - Primary    She feels significant improvement since starting on the duloxetine and increasing to the 40 mg dose.   She is also seeing a counselor at [facility].   Continue taking duloxetine 40 mg daily.   Refill sent today.   I told her I am very glad that she is feeling better.        Relevant Medications    DULoxetine (Cymbalta) 20 mg DR capsule         Please return to see me for next appt in October.     Scribe Attestation  By signing my name " below, I, Toby Sousa   attest that this documentation has been prepared under the direction and in the presence of Madisyn Chapman DO.         [1]   Current Outpatient Medications:     amLODIPine (Norvasc) 5 mg tablet, Take 1 tablet by mouth once daily, Disp: 30 tablet, Rfl: 6    B5-B6-PABA-cord-rhod-P.ginseng (Adrenal Essence) 43-03- mg capsule, Take 1 tablet by mouth once daily., Disp: , Rfl:     cholecalciferol, vitamin D3, 250 mcg (10,000 unit) capsule, Take 1 capsule (250 mcg) by mouth once daily., Disp: , Rfl:     DULoxetine (Cymbalta) 20 mg DR capsule, Take two capsules once daily., Disp: 180 capsule, Rfl: 1    esomeprazole (NexIUM) 40 mg DR capsule, Take 1 capsule (40 mg) by mouth 2 times a day., Disp: , Rfl:     magnesium oxide 400 mg magnesium capsule, Take 1 capsule (400 mg) by mouth once daily., Disp: , Rfl:     thyroid, pork, ([place]) 60 mg tablet, Take 1 tablet (60 mg) by mouth once daily., Disp: , Rfl:     UNABLE TO FIND, Take by mouth., Disp: , Rfl:

## 2025-06-27 NOTE — PATIENT INSTRUCTIONS
I am very glad the duloxetine is helping.  Continue on 40 mg total daily dose.     Keep next appointment with me.

## 2025-08-26 ENCOUNTER — OFFICE VISIT (OUTPATIENT)
Dept: PRIMARY CARE | Facility: CLINIC | Age: 65
End: 2025-08-26
Payer: COMMERCIAL

## 2025-08-26 PROBLEM — R10.2 SUPRAPUBIC PAIN: Status: ACTIVE | Noted: 2025-08-26

## 2025-08-26 PROBLEM — R39.15 URINARY URGENCY: Status: ACTIVE | Noted: 2025-08-26

## 2025-08-26 PROBLEM — R30.0 DYSURIA: Status: ACTIVE | Noted: 2025-08-26

## 2025-08-26 ASSESSMENT — PAIN SCALES - GENERAL: PAINLEVEL_OUTOF10: 0-NO PAIN

## 2025-08-26 ASSESSMENT — ENCOUNTER SYMPTOMS: DYSURIA: 1

## 2025-10-14 ENCOUNTER — APPOINTMENT (OUTPATIENT)
Dept: PRIMARY CARE | Facility: CLINIC | Age: 65
End: 2025-10-14
Payer: COMMERCIAL